# Patient Record
(demographics unavailable — no encounter records)

---

## 2024-10-17 NOTE — REASON FOR VISIT
[Patient preference] : as per patient preference [Access issues (e.g., transportation, impaired mobility, etc.)] : due to patient's access issues [Continuing, patient not seen in-person within last 12 months (provide details below)] : Telehealth services are continuing, patient not seen in-person within last 12 months.  [Telehealth (audio & video) - Individual/Group] : This visit was provided via telehealth using real-time 2-way audio visual technology. [Other Location: e.g. Home (Enter Location, City,State)___] : The patient, [unfilled], was located at [unfilled] at the time of the visit. [Patient's space is appropriate for telehealth and maintains privacy/confidentiality.] : Patient's space is appropriate for telehealth and maintains privacy/confidentiality. [Participant(s) identity verified] : Participant(s) identity verified. [Verbal consent obtained from patient/other participant(s)] : Verbal consent for telehealth/telephonic services obtained from patient/other participant(s) [FreeTextEntry4] : 1:06pm [FreeTextEntry5] : 1:59pm [FreeTextEntry3] : pt preference, access issues [Patient] : Patient [FreeTextEntry1] : worsened anxiety in past year, likely related to childhood trauma

## 2024-10-17 NOTE — PHYSICAL EXAM
[Average] : average [Agitated] : agitated [Cooperative] : cooperative [Anxious] : anxious [Clear] : clear [Circumstantial] : circumstantial [WNL] : within normal limits [de-identified] : anxious
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
## 2024-10-25 NOTE — PHYSICAL EXAM
[Average] : average [Cooperative] : cooperative [Anxious] : anxious [Clear] : clear [Circumstantial] : circumstantial [WNL] : within normal limits [de-identified] : anxious

## 2024-10-25 NOTE — PLAN
[FreeTextEntry2] : Reduce anxiety and trauma-related symptoms as evidenced by at least 20% reduction in symptom scales Learn at least 2 coping skills  [Cognitive Processing Therapy] : Cognitive Processing Therapy  [de-identified] :  PROGRESS TO DATE/UPDATES: -	Continues to use walking daily and is starting to practice radical acceptance; noticing reduced anxiety and improved mood and patience with daily activities. Still, continues to experience significant distress when feels anxious and worries about feeling "fear"; does not usually have insight into specific anxious cognitions. -	Pt reflected on Socratic discussion from last week, suggesting increased psychological flexibility -	Completed 2 Challenging Beliefs Worksheets (about a health issue, about telling  about her trauma history) -	Completed PCL-5  (score: 21, increased from last score of 15 on 8/28/24)  INTERVENTIONS/PLAN: - Administered PHQ-9 and CHARLES-7 scales; supported pt in better understanding the items as she appears to have been underreporting symptoms given distress described orally.   - Session focused on progress review. Recommended continue CPT and then consider additional therapy to address generalized anxiety. Pt agreed. - Given session time constraints, did not review completed worksheets, but pt agreed to email them along with her completed PCL-5. - Introduced the Core Theme of Power/Control, and helped pt identify any existing power/control-related Stuck Points on the log.  - For home practice, pt will complete 1 Challenging Beliefs Worksheet per day, focusing on power/control-related stuck points. Handout provided. [Recommended Frequency of Visits: ____] : Recommended frequency of visits: [unfilled] [FreeTextEntry1] :  PLAN: continue CPT

## 2024-10-25 NOTE — REASON FOR VISIT
[Patient preference] : as per patient preference [Access issues (e.g., transportation, impaired mobility, etc.)] : due to patient's access issues [Continuing, patient not seen in-person within last 12 months (provide details below)] : Telehealth services are continuing, patient not seen in-person within last 12 months.  [Telehealth (audio & video) - Individual/Group] : This visit was provided via telehealth using real-time 2-way audio visual technology. [Other Location: e.g. Home (Enter Location, City,State)___] : The patient, [unfilled], was located at [unfilled] at the time of the visit. [Patient's space is appropriate for telehealth and maintains privacy/confidentiality.] : Patient's space is appropriate for telehealth and maintains privacy/confidentiality. [Participant(s) identity verified] : Participant(s) identity verified. [Verbal consent obtained from patient/other participant(s)] : Verbal consent for telehealth/telephonic services obtained from patient/other participant(s) [FreeTextEntry4] : 2:10pm [FreeTextEntry5] : 3:00pm [FreeTextEntry3] : pt preference, access issues [Patient] : Patient [FreeTextEntry1] : worsened anxiety in past year, likely related to childhood trauma

## 2024-11-15 NOTE — PHYSICAL EXAM
[Average] : average [Cooperative] : cooperative [Clear] : clear [Linear/Goal Directed] : linear/goal directed [WNL] : within normal limits [FreeTextEntry8] : sad [de-identified] : mood congruent

## 2024-11-15 NOTE — REASON FOR VISIT
[Patient preference] : as per patient preference [Access issues (e.g., transportation, impaired mobility, etc.)] : due to patient's access issues [Continuing, patient not seen in-person within last 12 months (provide details below)] : Telehealth services are continuing, patient not seen in-person within last 12 months.  [Telehealth (audio & video) - Individual/Group] : This visit was provided via telehealth using real-time 2-way audio visual technology. [Other Location: e.g. Home (Enter Location, City,State)___] : The patient, [unfilled], was located at [unfilled] at the time of the visit. [Patient's space is appropriate for telehealth and maintains privacy/confidentiality.] : Patient's space is appropriate for telehealth and maintains privacy/confidentiality. [Participant(s) identity verified] : Participant(s) identity verified. [Verbal consent obtained from patient/other participant(s)] : Verbal consent for telehealth/telephonic services obtained from patient/other participant(s) [FreeTextEntry4] : 2:08pm [FreeTextEntry5] : 2:59pm [FreeTextEntry3] : pt preference, access issues [Patient] : Patient [FreeTextEntry1] : worsened anxiety in past year, likely related to childhood trauma

## 2024-11-27 NOTE — REASON FOR VISIT
[Patient preference] : as per patient preference [Access issues (e.g., transportation, impaired mobility, etc.)] : due to patient's access issues [Continuing, patient not seen in-person within last 12 months (provide details below)] : Telehealth services are continuing, patient not seen in-person within last 12 months.  [Telehealth (audio & video) - Individual/Group] : This visit was provided via telehealth using real-time 2-way audio visual technology. [Other Location: e.g. Home (Enter Location, City,State)___] : The patient, [unfilled], was located at [unfilled] at the time of the visit. [Patient's space is appropriate for telehealth and maintains privacy/confidentiality.] : Patient's space is appropriate for telehealth and maintains privacy/confidentiality. [Participant(s) identity verified] : Participant(s) identity verified. [Verbal consent obtained from patient/other participant(s)] : Verbal consent for telehealth/telephonic services obtained from patient/other participant(s) [FreeTextEntry4] : 1:08pm [FreeTextEntry5] : 2:00pm [FreeTextEntry3] : pt preference, access issues [Patient] : Patient [FreeTextEntry1] : worsened anxiety in past year, likely related to childhood trauma

## 2024-11-27 NOTE — PLAN
[FreeTextEntry2] : Reduce anxiety and trauma-related symptoms as evidenced by at least 20% reduction in symptom scales Learn at least 2 coping skills  [Cognitive Processing Therapy] : Cognitive Processing Therapy  [Motivational Interviewing] : Motivational Interviewing  [de-identified] : PROGRESS TO DATE: - Did not complete any Challenging Beliefs Worksheets in past 2 weeks. - Pt canceled last visit due to anniversary of her father's death, took day off from work, and did not have enough privacy at home - Anxious episode since Monday  INTERVENTIONS/PLAN: - Reflective discussion with pt on grief, her cognitive reactions, and emotional reactions. - MI implemented regarding homework completion. Discussion on role of avoidance in maintaining symptoms. Pt agreed to try completing assigned worksheets after her children go to bed and  leaves for work. [Recommended Frequency of Visits: ____] : Recommended frequency of visits: [unfilled] [FreeTextEntry1] :  PLAN: continue CPT

## 2024-11-27 NOTE — PHYSICAL EXAM
[Average] : average [Cooperative] : cooperative [Anxious] : anxious [Clear] : clear [Linear/Goal Directed] : linear/goal directed [WNL] : within normal limits [de-identified] : mood congruent

## 2024-12-04 NOTE — PLAN
[FreeTextEntry2] : Reduce anxiety and trauma-related symptoms as evidenced by at least 20% reduction in symptom scales Learn at least 2 coping skills  [Cognitive Processing Therapy] : Cognitive Processing Therapy  [de-identified] :  PROGRESS TO DATE/UPDATES: Overall feeling okay, experienced a couple of panic attacks Trying to wean self from medication without talking to her prescriber.   Pt acknowledged avoiding internal trauma cues and therapy homework. "Forced" herself to attempt 2 challenging beliefs worksheets (CBWs). Resisted urge to cancel today's visit.  INTERVENTIONS/PLAN: Discussed pt's approach to med management and suggested pt seek psychiatry consultation; pt agreed to call [facility] team for referral. Reflected increase in panic attacks as pt has increasingly resumed trauma-related avoidance behaviors. Reinforced pt for values- and goals-committed action this week in completing worksheets and attending today's session. Reviewed completed CBWs and supported pt in further confronting stuck points using Socratic dialogue.  For home practice, pt will continue completing CBWS for stuck points within themes of safety, trust, and power/control. Pt aware we will introduce next theme/module at upcoming visit.  [Recommended Frequency of Visits: ____] : Recommended frequency of visits: [unfilled] [FreeTextEntry1] :  PLAN: continue CPT

## 2024-12-04 NOTE — PHYSICAL EXAM
[Average] : average [Cooperative] : cooperative [Anxious] : anxious [Clear] : clear [Linear/Goal Directed] : linear/goal directed [WNL] : within normal limits [de-identified] : mood congruent

## 2024-12-04 NOTE — REASON FOR VISIT
[Patient preference] : as per patient preference [Access issues (e.g., transportation, impaired mobility, etc.)] : due to patient's access issues [Continuing, patient not seen in-person within last 12 months (provide details below)] : Telehealth services are continuing, patient not seen in-person within last 12 months.  [Telehealth (audio & video) - Individual/Group] : This visit was provided via telehealth using real-time 2-way audio visual technology. [Other Location: e.g. Home (Enter Location, City,State)___] : The patient, [unfilled], was located at [unfilled] at the time of the visit. [Patient's space is appropriate for telehealth and maintains privacy/confidentiality.] : Patient's space is appropriate for telehealth and maintains privacy/confidentiality. [Participant(s) identity verified] : Participant(s) identity verified. [Verbal consent obtained from patient/other participant(s)] : Verbal consent for telehealth/telephonic services obtained from patient/other participant(s) [FreeTextEntry4] : 2:09pm [FreeTextEntry5] : 3:01pm [FreeTextEntry3] : pt preference, access issues [Patient] : Patient [FreeTextEntry1] : worsened anxiety in past year, likely related to childhood trauma

## 2024-12-11 NOTE — PHYSICAL EXAM
[Average] : average [Cooperative] : cooperative [Anxious] : anxious [Clear] : clear [Linear/Goal Directed] : linear/goal directed [Circumstantial] : circumstantial [WNL] : within normal limits [de-identified] : mood congruent

## 2024-12-11 NOTE — PLAN
[FreeTextEntry2] : Reduce anxiety and trauma-related symptoms as evidenced by at least 20% reduction in symptom scales Learn at least 2 coping skills  [Cognitive Processing Therapy] : Cognitive Processing Therapy  [de-identified] :  PROGRESS TO DATE/UPDATES: Trouble sleeping last night so feeling tired with headache Describes manageable degree of anxiety overall in past week Completed 2 Challenging Beliefs Worksheets  INTERVENTIONS/PLAN: - Re-administered PCL-5 (score:  16,  improved from last administration in October when score was 21), PHQ-9 (minimal) and CHARLES-7 (minimal). - Socratic dialogue to support pt's review of completed Challenging Beliefs worksheets and reinforce confrontation of power/control-related stuck points. - Introduced the Core Theme of Esteem, and helped pt identify any existing esteem-related Stuck Points.  - For home practice, pt will complete 1 Challenging Beliefs Worksheet per day, focusing on esteem-related stuck points. Pt also will practice giving/receiving compliments and will do something nice for herself without having to do anything to earn it. Handouts provided. [Recommended Frequency of Visits: ____] : Recommended frequency of visits: [unfilled] [FreeTextEntry1] :  PLAN: continue CPT

## 2024-12-16 NOTE — HISTORY OF PRESENT ILLNESS
[de-identified] : 40 year old female for follow up of linea alba along the occlusive plane.  Previously linea alba showed improvement after taking doxycycline and using mouth guard.   Today patient is feeling well since last visit . She has been grinding her teeth at night more than usual and is using mouth guard but does forget at times.   No new white spots/lesions on Tongue at pervious site and notes darker spots on other side are still there but has not noticed any changes   Pt denies dysphonia, dysphagia, dyspnea or pain
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
## 2024-12-16 NOTE — PHYSICAL EXAM
[Midline] : trachea located in midline position [Normal] : no rashes [de-identified] : Right 2 cm linear alba along posterior aspect of gums adjacent to molars. Two lesions on left side both 1 cm Flat smooth exophytic Brusing along.

## 2024-12-16 NOTE — PHYSICAL EXAM
[Midline] : trachea located in midline position [Normal] : no rashes [de-identified] : Right 2 cm linear alba along posterior aspect of gums adjacent to molars. Two lesions on left side both 1 cm Flat smooth exophytic Brusing along.

## 2024-12-16 NOTE — HISTORY OF PRESENT ILLNESS
[de-identified] : 40 year old female for follow up of linea alba along the occlusive plane.  Previously linea alba showed improvement after taking doxycycline and using mouth guard.   Today patient is feeling well since last visit . She has been grinding her teeth at night more than usual and is using mouth guard but does forget at times.   No new white spots/lesions on Tongue at pervious site and notes darker spots on other side are still there but has not noticed any changes   Pt denies dysphonia, dysphagia, dyspnea or pain

## 2024-12-16 NOTE — REASON FOR VISIT
[Subsequent Evaluation] : a subsequent evaluation for [FreeTextEntry1] : linea alba along the occlusive plane
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
## 2025-01-09 NOTE — PHYSICAL EXAM
[Average] : average [Cooperative] : cooperative [Euthymic] : euthymic [Full] : full [Clear] : clear [Linear/Goal Directed] : linear/goal directed [WNL] : within normal limits [FreeTextEntry8] : situational anxiety

## 2025-01-09 NOTE — REASON FOR VISIT
[Patient preference] : as per patient preference [Access issues (e.g., transportation, impaired mobility, etc.)] : due to patient's access issues [Continuing, patient not seen in-person within last 12 months (provide details below)] : Telehealth services are continuing, patient not seen in-person within last 12 months.  [Telehealth (audio & video) - Individual/Group] : This visit was provided via telehealth using real-time 2-way audio visual technology. [Other Location: e.g. Home (Enter Location, City,State)___] : The patient, [unfilled], was located at [unfilled] at the time of the visit. [Patient's space is appropriate for telehealth and maintains privacy/confidentiality.] : Patient's space is appropriate for telehealth and maintains privacy/confidentiality. [Participant(s) identity verified] : Participant(s) identity verified. [Verbal consent obtained from patient/other participant(s)] : Verbal consent for telehealth/telephonic services obtained from patient/other participant(s) [FreeTextEntry4] : 2:07pm [FreeTextEntry5] : 3:04pm [FreeTextEntry3] : pt preference, access issues [Patient] : Patient [FreeTextEntry1] : worsened anxiety in past year, likely related to childhood trauma

## 2025-01-17 NOTE — REASON FOR VISIT
[Patient preference] : as per patient preference [Access issues (e.g., transportation, impaired mobility, etc.)] : due to patient's access issues [Continuing, patient not seen in-person within last 12 months (provide details below)] : Telehealth services are continuing, patient not seen in-person within last 12 months.  [Telehealth (audio & video) - Individual/Group] : This visit was provided via telehealth using real-time 2-way audio visual technology. [Other Location: e.g. Home (Enter Location, City,State)___] : The patient, [unfilled], was located at [unfilled] at the time of the visit. [Patient's space is appropriate for telehealth and maintains privacy/confidentiality.] : Patient's space is appropriate for telehealth and maintains privacy/confidentiality. [Participant(s) identity verified] : Participant(s) identity verified. [Verbal consent obtained from patient/other participant(s)] : Verbal consent for telehealth/telephonic services obtained from patient/other participant(s) [FreeTextEntry4] : 2:10pm [FreeTextEntry5] : 3:06pm [FreeTextEntry3] : pt preference, access issues [Patient] : Patient [FreeTextEntry1] : worsened anxiety in past year, likely related to childhood trauma

## 2025-01-17 NOTE — PLAN
[FreeTextEntry2] : Reduce anxiety and trauma-related symptoms as evidenced by at least 20% reduction in symptom scales Learn at least 2 coping skills  [Cognitive Processing Therapy] : Cognitive Processing Therapy  [de-identified] :  PROGRESS TO DATE/UPDATES: Intermittent anxiety, pt starting to track her anxiety more to try to identify patterns or triggers.  Attempted 1 assigned intimacy-related Challenging Beliefs Worksheet and new impact statement.  INTERVENTIONS/PLAN: - Socratic dialogue to support pt's review of completed Challenging Beliefs Worksheet (CBW). Encouraged pt to continue completing CBWs for homework, emphasizing looking for evidence. - Due to technical difficulties and time restraints, deferred reviewing pt's impact statement to next session. [Recommended Frequency of Visits: ____] : Recommended frequency of visits: [unfilled] [FreeTextEntry1] :  PLAN: continue CPT
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
## 2025-01-29 NOTE — REASON FOR VISIT
[Patient preference] : as per patient preference [Access issues (e.g., transportation, impaired mobility, etc.)] : due to patient's access issues [Continuing, patient not seen in-person within last 12 months (provide details below)] : Telehealth services are continuing, patient not seen in-person within last 12 months.  [Telehealth (audio & video) - Individual/Group] : This visit was provided via telehealth using real-time 2-way audio visual technology. [Other Location: e.g. Home (Enter Location, City,State)___] : The patient, [unfilled], was located at [unfilled] at the time of the visit. [Patient's space is appropriate for telehealth and maintains privacy/confidentiality.] : Patient's space is appropriate for telehealth and maintains privacy/confidentiality. [Participant(s) identity verified] : Participant(s) identity verified. [Verbal consent obtained from patient/other participant(s)] : Verbal consent for telehealth/telephonic services obtained from patient/other participant(s) [FreeTextEntry4] : 2:06pm [FreeTextEntry5] : 2:36pm [FreeTextEntry3] : pt preference, access issues [Patient] : Patient [FreeTextEntry1] : worsened anxiety in past year, likely related to childhood trauma

## 2025-02-05 NOTE — PLAN
[FreeTextEntry2] : Reduce anxiety and trauma-related symptoms as evidenced by at least 20% reduction in symptom scales Learn at least 2 coping skills  [Cognitive Processing Therapy] : Cognitive Processing Therapy  [de-identified] : Session focused on review of pt's last Impact Statement. Pt wrote about behavioral changes, rather than beliefs. Provided therapeutic space in session for pt to continue impact statement verbally with focus on beliefs. Debriefed on the experience and implemented Socratic dialogue to support pt self-reflection progress across treatment. Discussed pt's completion of formal CPT modules. Collaboratively agreed to further discuss progress towards treatment goals and discuss treatment f/u options at next visit. For homework, pt will complete CHARLES-7, PHQ-9, and PCL-5. Pt will consider her current goals for therapy to inform our discussion next session. [Recommended Frequency of Visits: ____] : Recommended frequency of visits: [unfilled] [FreeTextEntry1] :  PLAN: progress review and treatment planning

## 2025-02-05 NOTE — REASON FOR VISIT
[Patient preference] : as per patient preference [Access issues (e.g., transportation, impaired mobility, etc.)] : due to patient's access issues [Continuing, patient not seen in-person within last 12 months (provide details below)] : Telehealth services are continuing, patient not seen in-person within last 12 months.  [Telehealth (audio & video) - Individual/Group] : This visit was provided via telehealth using real-time 2-way audio visual technology. [Other Location: e.g. Home (Enter Location, City,State)___] : The patient, [unfilled], was located at [unfilled] at the time of the visit. [Patient's space is appropriate for telehealth and maintains privacy/confidentiality.] : Patient's space is appropriate for telehealth and maintains privacy/confidentiality. [Participant(s) identity verified] : Participant(s) identity verified. [Verbal consent obtained from patient/other participant(s)] : Verbal consent for telehealth/telephonic services obtained from patient/other participant(s) [FreeTextEntry4] : 2:07pm [FreeTextEntry5] : 2:56pm [FreeTextEntry3] : pt preference, access issues [Patient] : Patient [FreeTextEntry1] : worsened anxiety in past year, likely related to childhood trauma

## 2025-02-12 NOTE — PHYSICAL EXAM
[Average] : average [Cooperative] : cooperative [Angry] : angry [Full] : full [Clear] : clear [Linear/Goal Directed] : linear/goal directed [WNL] : within normal limits [FreeTextEntry8] : situational anxiety

## 2025-02-12 NOTE — REASON FOR VISIT
[Patient preference] : as per patient preference [Access issues (e.g., transportation, impaired mobility, etc.)] : due to patient's access issues [Continuing, patient not seen in-person within last 12 months (provide details below)] : Telehealth services are continuing, patient not seen in-person within last 12 months.  [Telehealth (audio & video) - Individual/Group] : This visit was provided via telehealth using real-time 2-way audio visual technology. [Other Location: e.g. Home (Enter Location, City,State)___] : The patient, [unfilled], was located at [unfilled] at the time of the visit. [Patient's space is appropriate for telehealth and maintains privacy/confidentiality.] : Patient's space is appropriate for telehealth and maintains privacy/confidentiality. [Participant(s) identity verified] : Participant(s) identity verified. [Verbal consent obtained from patient/other participant(s)] : Verbal consent for telehealth/telephonic services obtained from patient/other participant(s) [FreeTextEntry4] : 2:08pm [FreeTextEntry5] : 3:01pm [FreeTextEntry3] : pt preference, access issues [Patient] : Patient [FreeTextEntry1] : worsened anxiety in past year, likely related to childhood trauma
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
## 2025-03-05 NOTE — PLAN
[FreeTextEntry2] : Reduce anxiety and trauma-related symptoms as evidenced by at least 20% reduction in symptom scales -- MET Learn at least 2 coping skills  Increase awareness of values by creating Values Compass and begin using to set values-guided action [Acceptance and Commitment Therapy] : Acceptance and Commitment Therapy  [Cognitive Processing Therapy] : Cognitive Processing Therapy  [de-identified] : PROGRESS TO DATE/UPDATES: Pt cancelled last session due to work conflict. Reports overwhelming herself with trying to help relatives with various needs. Continues daily walks. More naturally implementing thought confrontation when experiences trauma cues.  INTERVENTIONS/PLAN: - reinforced pt's use of thought confrontation skills. - session focused on psychoeducation regarding ACT approach, discussion of suffering/control agenda, and myths of happiness. pt took notes and demonstrated interest in this approach. Pt will read article about ACT and watch 'myths of happiness' video for homework.  [Recommended Frequency of Visits: ____] : Recommended frequency of visits: [unfilled] [FreeTextEntry1] :  PLAN: continue ACT

## 2025-03-05 NOTE — PHYSICAL EXAM
[Average] : average [Cooperative] : cooperative [Full] : full [Clear] : clear [Linear/Goal Directed] : linear/goal directed [WNL] : within normal limits [FreeTextEntry8] : situational anxiety

## 2025-03-05 NOTE — REASON FOR VISIT
[Patient preference] : as per patient preference [Access issues (e.g., transportation, impaired mobility, etc.)] : due to patient's access issues [Continuing, patient not seen in-person within last 12 months (provide details below)] : Telehealth services are continuing, patient not seen in-person within last 12 months.  [Telehealth (audio & video) - Individual/Group] : This visit was provided via telehealth using real-time 2-way audio visual technology. [Other Location: e.g. Home (Enter Location, City,State)___] : The patient, [unfilled], was located at [unfilled] at the time of the visit. [Patient's space is appropriate for telehealth and maintains privacy/confidentiality.] : Patient's space is appropriate for telehealth and maintains privacy/confidentiality. [Participant(s) identity verified] : Participant(s) identity verified. [Verbal consent obtained from patient/other participant(s)] : Verbal consent for telehealth/telephonic services obtained from patient/other participant(s) [FreeTextEntry4] : 2:10pm [FreeTextEntry3] : pt preference, access issues [FreeTextEntry5] : 2:58pm [Patient] : Patient [FreeTextEntry1] : worsened anxiety in past year, likely related to childhood trauma

## 2025-03-26 NOTE — REASON FOR VISIT
[Patient preference] : as per patient preference [Access issues (e.g., transportation, impaired mobility, etc.)] : due to patient's access issues [Continuing, patient not seen in-person within last 12 months (provide details below)] : Telehealth services are continuing, patient not seen in-person within last 12 months.  [Telehealth (audio & video) - Individual/Group] : This visit was provided via telehealth using real-time 2-way audio visual technology. [Other Location: e.g. Home (Enter Location, City,State)___] : The patient, [unfilled], was located at [unfilled] at the time of the visit. [Patient's space is appropriate for telehealth and maintains privacy/confidentiality.] : Patient's space is appropriate for telehealth and maintains privacy/confidentiality. [Participant(s) identity verified] : Participant(s) identity verified. [Verbal consent obtained from patient/other participant(s)] : Verbal consent for telehealth/telephonic services obtained from patient/other participant(s) [FreeTextEntry4] : 1:09pm [FreeTextEntry5] : 2:02pm [FreeTextEntry3] : pt preference, access issues [Patient] : Patient [FreeTextEntry1] : worsened anxiety in past year, likely related to childhood trauma
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
## 2025-04-15 NOTE — REASON FOR VISIT
[Patient preference] : as per patient preference [Access issues (e.g., transportation, impaired mobility, etc.)] : due to patient's access issues [Continuing, patient not seen in-person within last 12 months (provide details below)] : Telehealth services are continuing, patient not seen in-person within last 12 months.  [Telehealth (audio & video) - Individual/Group] : This visit was provided via telehealth using real-time 2-way audio visual technology. [Other Location: e.g. Home (Enter Location, City,State)___] : The patient, [unfilled], was located at [unfilled] at the time of the visit. [Patient's space is appropriate for telehealth and maintains privacy/confidentiality.] : Patient's space is appropriate for telehealth and maintains privacy/confidentiality. [Participant(s) identity verified] : Participant(s) identity verified. [Verbal consent obtained from patient/other participant(s)] : Verbal consent for telehealth/telephonic services obtained from patient/other participant(s) [FreeTextEntry4] : 1:08pm [FreeTextEntry5] : 1:52pm [FreeTextEntry3] : pt preference, access issues [Patient] : Patient [FreeTextEntry1] : worsened anxiety in past year, likely related to childhood trauma

## 2025-04-15 NOTE — PLAN
[FreeTextEntry2] : Reduce anxiety and trauma-related symptoms as evidenced by at least 20% reduction in symptom scales -- MET Learn at least 2 coping skills  Increase awareness of values by creating Values Compass and begin using to set values-guided action [Acceptance and Commitment Therapy] : Acceptance and Commitment Therapy  [Prolonged Exposure] : Prolonged Exposure  [de-identified] : Pt cancelled last session due to illness Pt practiced exposure assignment 2-3 times in past few weeks. Otherwise, has avoided exposure assignment, despite self and coworker encouragement. Pt described significant distress when thinking about the trauma and trying to avoid it whenever possible.  Clinician supported pt in conducted in vivo exposure activities in session. Debriefed and reflected decrease in distress as activity progressed. Presented prolonged exposure therapy (PE) as a treatment option to address avoidance, process, and build mastery.  Pt ambivalent, yet interested in receiving more information to review for homework.  Will continue to discuss treatment options at future visit(s).  [Recommended Frequency of Visits: ____] : Recommended frequency of visits: [unfilled] [FreeTextEntry1] :  PLAN: continue ACT continue treatment planning

## 2025-04-24 NOTE — REASON FOR VISIT
[Patient preference] : as per patient preference [Access issues (e.g., transportation, impaired mobility, etc.)] : due to patient's access issues [Continuing, patient not seen in-person within last 12 months (provide details below)] : Telehealth services are continuing, patient not seen in-person within last 12 months.  [Telehealth (audio & video) - Individual/Group] : This visit was provided via telehealth using real-time 2-way audio visual technology. [Other Location: e.g. Home (Enter Location, City,State)___] : The patient, [unfilled], was located at [unfilled] at the time of the visit. [Patient's space is appropriate for telehealth and maintains privacy/confidentiality.] : Patient's space is appropriate for telehealth and maintains privacy/confidentiality. [Participant(s) identity verified] : Participant(s) identity verified. [Verbal consent obtained from patient/other participant(s)] : Verbal consent for telehealth/telephonic services obtained from patient/other participant(s) [FreeTextEntry4] : 2:11pm [FreeTextEntry5] : 2:59pm [FreeTextEntry3] : pt preference, access issues [Patient] : Patient [FreeTextEntry1] : worsened anxiety in past year, likely related to childhood trauma

## 2025-04-24 NOTE — PLAN
[FreeTextEntry2] : Reduce anxiety and trauma-related symptoms as evidenced by at least 20% reduction in symptom scales -- MET Learn at least 2 coping skills  Increase awareness of values by creating Values Compass and begin using to set values-guided action [Motivational Interviewing] : Motivational Interviewing  [de-identified] : Pt entered session stating that she has been considering Prolonged Exposure (PE) and remains ambivalent but wants to continue trauma-focused therapy.  Motivational interviewing implemented by clinician to support pt's exploration of feelings about change, motivation, and readiness for change. Pt left session acknowledging potential barriers to engagement in PE but stating her readiness to start next session. Clinician reviewed clinical principles and approach of PE.  For home practice, pt will practice controlled breathing and generate list of avoided situations.  - Informed pt of clinician's upcoming summer maternity leave and briefly discussed treatment/follow-up options. Will continue to monitor and assess needs as leave approaches.  [Recommended Frequency of Visits: ____] : Recommended frequency of visits: [unfilled] [FreeTextEntry1] :  PLAN: continue PE

## 2025-04-30 NOTE — REASON FOR VISIT
[Patient preference] : as per patient preference [Access issues (e.g., transportation, impaired mobility, etc.)] : due to patient's access issues [Continuing, patient not seen in-person within last 12 months (provide details below)] : Telehealth services are continuing, patient not seen in-person within last 12 months.  [Telehealth (audio & video) - Individual/Group] : This visit was provided via telehealth using real-time 2-way audio visual technology. [Other Location: e.g. Home (Enter Location, City,State)___] : The patient, [unfilled], was located at [unfilled] at the time of the visit. [Patient's space is appropriate for telehealth and maintains privacy/confidentiality.] : Patient's space is appropriate for telehealth and maintains privacy/confidentiality. [Participant(s) identity verified] : Participant(s) identity verified. [Verbal consent obtained from patient/other participant(s)] : Verbal consent for telehealth/telephonic services obtained from patient/other participant(s) [FreeTextEntry4] : 1:09pm [FreeTextEntry5] : 2:06pm [FreeTextEntry3] : pt preference, access issues [Patient] : Patient [FreeTextEntry1] : worsened anxiety in past year, likely related to childhood trauma

## 2025-04-30 NOTE — PLAN
[FreeTextEntry2] : Reduce anxiety and trauma-related symptoms as evidenced by at least 20% reduction in symptom scales -- MET Learn at least 2 coping skills  Increase awareness of values by creating Values Compass and begin using to set values-guided action [Prolonged Exposure] : Prolonged Exposure  [de-identified] :  PROGRESS TO DATE/UPDATES: Increased emotionality in past week. Brief episode of intrusive thoughts or flashback this morning. Maintains commitment to pursue PE and address avoidance symptoms.  INTERVENTIONS/PLAN: Reviewed common reactions to trauma and role of avoidance.  Discussed clinical rationale for in vivo exposure and assisted pt in further developing in vivo exposure hierarchy. Provided psychoeducation regarding Subjective Units of Distress scale (SUDS) and supported pt in discussing her anchor points. Taught use of In Vivo Exposure Homework Recording Form. Copy provided. Collaboratively set home practice goals for in vivo exposure work and reminded pt to bring recording device to next session to begin imaginal exposure work.  [Recommended Frequency of Visits: ____] : Recommended frequency of visits: [unfilled] [FreeTextEntry1] :  PLAN: continue PE
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
## 2025-05-14 NOTE — REASON FOR VISIT
[Patient preference] : as per patient preference [Access issues (e.g., transportation, impaired mobility, etc.)] : due to patient's access issues [Continuing, patient not seen in-person within last 12 months (provide details below)] : Telehealth services are continuing, patient not seen in-person within last 12 months.  [Telehealth (audio & video) - Individual/Group] : This visit was provided via telehealth using real-time 2-way audio visual technology. [Other Location: e.g. Home (Enter Location, City,State)___] : The patient, [unfilled], was located at [unfilled] at the time of the visit. [Patient's space is appropriate for telehealth and maintains privacy/confidentiality.] : Patient's space is appropriate for telehealth and maintains privacy/confidentiality. [Participant(s) identity verified] : Participant(s) identity verified. [Verbal consent obtained from patient/other participant(s)] : Verbal consent for telehealth/telephonic services obtained from patient/other participant(s) [FreeTextEntry4] : 2:07pm [FreeTextEntry5] : 3:01pm [FreeTextEntry3] : pt preference, access issues [Patient] : Patient [FreeTextEntry1] : worsened anxiety in past year, likely related to childhood trauma

## 2025-05-29 NOTE — REASON FOR VISIT
[Patient preference] : as per patient preference [Access issues (e.g., transportation, impaired mobility, etc.)] : due to patient's access issues [Continuing, patient not seen in-person within last 12 months (provide details below)] : Telehealth services are continuing, patient not seen in-person within last 12 months.  [Telehealth (audio & video) - Individual/Group] : This visit was provided via telehealth using real-time 2-way audio visual technology. [Other Location: e.g. Home (Enter Location, City,State)___] : The patient, [unfilled], was located at [unfilled] at the time of the visit. [Patient's space is appropriate for telehealth and maintains privacy/confidentiality.] : Patient's space was not appropriate for telehealth and did not maintain privacy/confidentiality. [Other: ___] : [unfilled] [Despite space not being appropriate, patient requested to continue appointment] : Despite space not being appropriate, patient requested to continue appointment [Participant(s) identity verified] : Participant(s) identity verified. [Verbal consent obtained from patient/other participant(s)] : Verbal consent for telehealth/telephonic services obtained from patient/other participant(s) [FreeTextEntry4] : 2:14pm [FreeTextEntry5] : 2:59pm [FreeTextEntry3] : pt preference, access issues [Patient] : Patient [FreeTextEntry1] : worsened anxiety in past year, likely related to childhood trauma
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
## 2025-06-02 NOTE — HISTORY OF PRESENT ILLNESS
[de-identified] : 40 year old female for follow up of linea alba along the occlusive plane.  Previously linea alba showed improvement after taking doxycycline and using mouth guard.   Today patient is feeling well since last visit . She has been grinding her teeth at night more than usual and is using mouth guard but does forget at times. Reports some new white lesions on the R side of her mouth since last week, attributing this to dental work that was done last week. Pt denies dysphonia, dysphagia, dyspnea or pain

## 2025-06-02 NOTE — HISTORY OF PRESENT ILLNESS
[de-identified] : 40 year old female for follow up of linea alba along the occlusive plane.  Previously linea alba showed improvement after taking doxycycline and using mouth guard.   Today patient is feeling well since last visit . She has been grinding her teeth at night more than usual and is using mouth guard but does forget at times. Reports some new white lesions on the R side of her mouth since last week, attributing this to dental work that was done last week. Pt denies dysphonia, dysphagia, dyspnea or pain

## 2025-06-02 NOTE — ASSESSMENT
[FreeTextEntry1] : 40 year old female for follow up of linea alba along the occlusive plane. At her last appointment she reported improvement after taking doxycycline and using mouth guard.   -On exam bilateral linear alba along posterior aspect of gums adjacent to molars and appearing to be related to trauma and pt endorses teeth grinding at night.  --No other concerning lesion on exam today, -Right posterior right molar has sharp edge that may be attributing to pt's symptoms, pt will call dentist to discuss treatment  -Improvement of linear alba on physical exam, encouraged to continue with  -Reinforcement of the importance of Hydration, soft food, use of  to allow healing -Continue  -Follow up in 6 months -She is in agreement with this plan.   spouse

## 2025-06-02 NOTE — PHYSICAL EXAM
[Midline] : trachea located in midline position [Normal] : no rashes [de-identified] : Bilateral linear alba along posterior aspect of gums adjacent to molars.

## 2025-06-02 NOTE — PHYSICAL EXAM
[Midline] : trachea located in midline position [Normal] : no rashes [de-identified] : Bilateral linear alba along posterior aspect of gums adjacent to molars.

## 2025-06-12 NOTE — REASON FOR VISIT
[Patient preference] : as per patient preference [Access issues (e.g., transportation, impaired mobility, etc.)] : due to patient's access issues [Continuing, patient not seen in-person within last 12 months (provide details below)] : Telehealth services are continuing, patient not seen in-person within last 12 months.  [Telehealth (audio & video) - Individual/Group] : This visit was provided via telehealth using real-time 2-way audio visual technology. [Other Location: e.g. Home (Enter Location, City,State)___] : The patient, [unfilled], was located at [unfilled] at the time of the visit. [Patient's space is appropriate for telehealth and maintains privacy/confidentiality.] : Patient's space is appropriate for telehealth and maintains privacy/confidentiality. [Participant(s) identity verified] : Participant(s) identity verified. [Verbal consent obtained from patient/other participant(s)] : Verbal consent for telehealth/telephonic services obtained from patient/other participant(s) [FreeTextEntry4] : 2:19pm [FreeTextEntry5] : 3:01pm [FreeTextEntry3] : pt preference, access issues [Patient] : Patient [FreeTextEntry1] : worsened anxiety in past year, likely related to childhood trauma

## 2025-06-12 NOTE — PLAN
[FreeTextEntry2] : Reduce anxiety and trauma-related symptoms as evidenced by at least 20% reduction in symptom scales -- MET Learn at least 2 coping skills  Increase awareness of values by creating Values Compass and begin using to set values-guided action [Prolonged Exposure] : Prolonged Exposure  [de-identified] : PROGRESS TO DATE/UPDATES: Resisted urge to cancel today's visit due to extended family-related stressors and pt illness. Listened to previous imaginal exposure recording nearly daily; finds herself feeling more open and confident, in particular when speaking with her  about sensitive topics. Did not engage in other exposure exercises.   INTERVENTIONS/PLAN: - Debriefed pt's experiences with exposure activities. Supported pt in planning for in vivo exposure work. - Conduct an imaginal exposure in session with several repetitions. Pt recorded today's exposure and agreed to listen to it daily for homework. Debriefed pt experiences with imaginal exposure. - continued discussion of treatment planning during provider's upcoming maternity leave. Pt agreed to transfer to [facility] psychologist, Dr. Sangita Peña. Pt is aware that Dr. Peña will be going on maternity leave herself towards the end of July.  [Recommended Frequency of Visits: ____] : Recommended frequency of visits: [unfilled] [FreeTextEntry1] :  PLAN: continue PE

## 2025-06-19 NOTE — PLAN
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 Pt finds her own strength and confidence through providing her support.  INTERVENTIONS/PLAN: - Re-administered CHARLES-7, PHQ-9, and PCL-5 scales - all WNL and minimal - Discussed and debriefed pt's experiences with exposure activities. Supported pt in planning for in vivo exposure work, as well as reflecting on her experiences of providing emotional support for a young female family member - Supported pt in conducting and recording imaginal exposure in session with several repetitions. Pt agreed to listen to today's recording at least 3times prior to next visit. Debriefed pt experiences with imaginal exposure.  [Recommended Frequency of Visits: ____] : Recommended frequency of visits: [unfilled] [FreeTextEntry1] :  PLAN: - continue PE - anticipate termination with this provider at next session due to maternity leave. pt will be transferring to [facility] psychologist, Dr. Peña

## 2025-06-19 NOTE — PHYSICAL EXAM
[Average] : average [Cooperative] : cooperative [Constricted] : constricted [Clear] : clear [Linear/Goal Directed] : linear/goal directed [WNL] : within normal limits [FreeTextEntry8] : down, due to back pain

## 2025-06-19 NOTE — REASON FOR VISIT
[Patient preference] : as per patient preference [Access issues (e.g., transportation, impaired mobility, etc.)] : due to patient's access issues [Continuing, patient not seen in-person within last 12 months (provide details below)] : Telehealth services are continuing, patient not seen in-person within last 12 months.  [Telehealth (audio & video) - Individual/Group] : This visit was provided via telehealth using real-time 2-way audio visual technology. [Other Location: e.g. Home (Enter Location, City,State)___] : The patient, [unfilled], was located at [unfilled] at the time of the visit. [Patient's space is appropriate for telehealth and maintains privacy/confidentiality.] : Patient's space is appropriate for telehealth and maintains privacy/confidentiality. [Participant(s) identity verified] : Participant(s) identity verified. [Verbal consent obtained from patient/other participant(s)] : Verbal consent for telehealth/telephonic services obtained from patient/other participant(s) [FreeTextEntry4] : 12:13pm [FreeTextEntry5] : 1:01pm [FreeTextEntry3] : pt preference, access issues [Patient] : Patient [FreeTextEntry1] : worsened anxiety in past year, likely related to childhood trauma

## 2025-06-19 NOTE — ADDENDUM
[FreeTextEntry1] : PCL-5 total scores: Date	        Score 6/19/2024	33 8/28/2024	15 10/23/2024	21 12/11/2024	16 2/10/2025	3 6/19/2025	5

## 2025-06-24 NOTE — PLAN
[FreeTextEntry2] : Reduce anxiety and trauma-related symptoms as evidenced by at least 20% reduction in symptom scales -- MET Learn at least 2 coping skills  Increase awareness of values by creating Values Compass and begin using to set values-guided action [Prolonged Exposure] : Prolonged Exposure  [de-identified] : PROGRESS TO DATE/UPDATES: -	Pt arrived late to session and needs to end early due to work conflict -	In process of scheduling telehealth session with [facility] psychologist, Dr. Sangita Peña -	For first time, experienced her urge to tell her mother about her trauma history; did not yet tell her. Pt acknowledging she now wants to be more open to her history and to sharing her thoughts and emotions with others. -	Listened to most recent imaginal exposure exercise. Peak SUDS in 40s/100  INTERVENTIONS/PLAN: - Debriefed pt's experiences with exposure activities. Supported pt in exploring her new openness to her own experiences. Reflected on bravery in resisting trauma-related avoidance - Due to pt's visit time constraints, a new imaginal exposure was not conducted today. Provider encouraged pt continue with previously assigned exposure tasks for home practice until she meets with Dr. Peña. Pt verbalized agreement. - Reviewed therapy transition plan, given this provider's maternity leave. Pt expressed gratitude for her work with this provider and progress to date. Pt continues to agree to care transfer within [facility] to Dr. Peña.  Addressed any questions.  Further f/u with this provider is not planned at this time. [Recommended Frequency of Visits: ____] : Recommended frequency of visits: [unfilled] [FreeTextEntry1] :  PLAN: - termination with this provider due to maternity leave. pt will be transferring to [facility] psychologist, Dr. Peña

## 2025-06-24 NOTE — REASON FOR VISIT
[Patient preference] : as per patient preference [Access issues (e.g., transportation, impaired mobility, etc.)] : due to patient's access issues [Continuing, patient not seen in-person within last 12 months (provide details below)] : Telehealth services are continuing, patient not seen in-person within last 12 months.  [Telehealth (audio & video) - Individual/Group] : This visit was provided via telehealth using real-time 2-way audio visual technology. [Medical Office: ([facility])___] : The provider was located at the medical office in [unfilled]. [Other Location: e.g. Home (Enter Location, City,State)___] : The patient, [unfilled], was located at [unfilled] at the time of the visit. [Patient's space is appropriate for telehealth and maintains privacy/confidentiality.] : Patient's space is appropriate for telehealth and maintains privacy/confidentiality. [Participant(s) identity verified] : Participant(s) identity verified. [Verbal consent obtained from patient/other participant(s)] : Verbal consent for telehealth/telephonic services obtained from patient/other participant(s) [FreeTextEntry4] : 11:11am [FreeTextEntry5] : 11:32am [FreeTextEntry3] : pt preference, access issues [Patient] : Patient [FreeTextEntry1] : worsened anxiety in past year, likely related to childhood trauma

## 2025-07-14 NOTE — PLAN
[FreeTextEntry2] : Problem: PTSD and anxiety sx's  goal: engage in and complete course of PE [Prolonged Exposure] : Prolonged Exposure  [de-identified] : This writer met with patient for first time since transfer from previous provider to complete course of PE. This writer reminded pt that they also have limited time to work together due to this writer also going out on [place]. This writer had pt provide brief update on her perspective of treatment with previous provider and if it has been helpful so far. Following this introduction, pt completed self-report measures and engaged in imaginal exposure. Pt appeared anxious to verbalize specific details of the event. This writer encouraged continued expansion of the narrative and explained rationale as to why after exposure. SUDs peaked at 57. Processed emotions following and assigned in-vivo homework (to watch different types of tv shows). Pt reported [place] will be out of the [place] the following week and cannot schedule. Plan to meet twice upon return.   CHARLES-7= 3 (minimal sx's) PHQ-9= 2 (minimal sx's) [Recommended Frequency of Visits: ____] : Recommended frequency of visits: [unfilled] [Return in ____ week(s)] : Return in [unfilled] week(s) [FreeTextEntry1] : continue PE in follow up

## 2025-07-14 NOTE — REASON FOR VISIT
[Patient preference] : as per patient preference [Telehealth (audio & video) - Individual/Group] : This visit was provided via telehealth using real-time 2-way audio visual technology. [Other Location: e.g. Home (Enter Location, City,State)___] : The patient, [unfilled], was located at [unfilled] at the time of the visit. [Patient's space is appropriate for telehealth and maintains privacy/confidentiality.] : Patient's space is appropriate for telehealth and maintains privacy/confidentiality. [Participant(s) identity verified] : Participant(s) identity verified. [Verbal consent obtained from patient/other participant(s)] : Verbal consent for telehealth/telephonic services obtained from patient/other participant(s) [FreeTextEntry4] : 100pm [FreeTextEntry5] : 153pm [Patient] : Patient [FreeTextEntry1] : past trauma

## 2025-07-21 NOTE — PLAN
[FreeTextEntry2] : Problem: PTSD and anxiety sx's  goal: engage in and complete course of PE [Prolonged Exposure] : Prolonged Exposure  [de-identified] : This writer met with patient for follow up session of PE. Pt acknowledged feeling anxious to engage in imaginal exposure due to increased detail that she would have to provide, though self-report measures do not reflect reported worry. This writer encouraged continued expansion of the narrative and explained rationale as to why at the start of imaginal session. SUDs peaked at 90 and pt got through 5 repetitions of the memory. Processed emotions following and assigned in-vivo homework (to watch different types of tv shows) and to listen to the imaginal recording daily. Plan to meet in 3 days.  CHARLES-7= 1 (minimal sx's) PCL-5= 2 (minimal sx's) [Recommended Frequency of Visits: ____] : Recommended frequency of visits: [unfilled] [Return in ____ week(s)] : Return in [unfilled] week(s)

## 2025-07-21 NOTE — PHYSICAL EXAM
[Well groomed] : well groomed [Average] : average [Cooperative] : cooperative [Euthymic] : euthymic [Anxious] : anxious [Full] : full [Clear] : clear [Linear/Goal Directed] : linear/goal directed [None] : none [None Reported] : none reported [WNL] : within normal limits [Not applicable] : not applicable

## 2025-07-21 NOTE — REASON FOR VISIT
[Patient preference] : as per patient preference [Telehealth (audio & video) - Individual/Group] : This visit was provided via telehealth using real-time 2-way audio visual technology. [Other Location: e.g. Home (Enter Location, City,State)___] : The provider was located at [unfilled]. [Home] : The patient, [unfilled], was located at home, [unfilled], at the time of the visit. [Patient's space is appropriate for telehealth and maintains privacy/confidentiality.] : Patient's space is appropriate for telehealth and maintains privacy/confidentiality. [Participant(s) identity verified] : Participant(s) identity verified. [Verbal consent obtained from patient/other participant(s)] : Verbal consent for telehealth/telephonic services obtained from patient/other participant(s) [FreeTextEntry4] : 1:10pm [FreeTextEntry5] : 2:05pm [Patient] : Patient [FreeTextEntry1] : past trauma

## 2025-07-28 NOTE — REASON FOR VISIT
[Patient preference] : as per patient preference [Telehealth (audio & video) - Individual/Group] : This visit was provided via telehealth using real-time 2-way audio visual technology. [Other Location: e.g. Home (Enter Location, City,State)___] : The patient, [unfilled], was located at [unfilled] at the time of the visit. [Patient's space is appropriate for telehealth and maintains privacy/confidentiality.] : Patient's space is appropriate for telehealth and maintains privacy/confidentiality. [Participant(s) identity verified] : Participant(s) identity verified. [Verbal consent obtained from patient/other participant(s)] : Verbal consent for telehealth/telephonic services obtained from patient/other participant(s) [FreeTextEntry4] : 2:10pm [FreeTextEntry5] : 2:50pm [Patient] : Patient [FreeTextEntry1] : trauma processing

## 2025-07-28 NOTE — PLAN
[FreeTextEntry2] : Problem: PTSD and anxiety sx's  goal: engage in and complete course of PE  problem 1: PTSD sx (hypervigilance, irritability, intrusive and unwanted memories/cognitions)  Goal 1: decrease PTSD symptoms by 30% as evidenced by PCL-5 Goal 2: develop and utilize coping strategies to manage and decrease symptoms Goal 3: complete a course of Prolonged Exposure, with successful decline in symptoms  [Supportive Therapy] : Supportive Therapy [de-identified] : This writer met with pt for individual therapy. Pt was 10 minutes late to appointment. She expressed feeling anxious and not ready to continue at this time with PE and stated that she "needs more time to process the intensity." Inquired further as to what this means for pt. She indicated that she had not gone as in detail with previous provider during imaginal exposure sessions and was feeling anxious about doing so and continuing. This writer validated pt's experience and provided psychoeducation on the treatment of PE and role of avoidance she has likely been understandably engaging in. Discussed the very limited timeframe that is left to work together and that it might be better to transfer pt to another provider on the [facility] team if she wants to continue trauma processing but needs to move through the treatment slower. Pt agreed to this. Plan to transfer pt to another provider for continued trauma processing with the understanding that the transfer may not be immediate. No further sessions scheduled with this writer.